# Patient Record
Sex: MALE | Race: WHITE | NOT HISPANIC OR LATINO | Employment: OTHER | URBAN - METROPOLITAN AREA
[De-identification: names, ages, dates, MRNs, and addresses within clinical notes are randomized per-mention and may not be internally consistent; named-entity substitution may affect disease eponyms.]

---

## 2019-12-27 ENCOUNTER — HOSPITAL ENCOUNTER (OUTPATIENT)
Dept: TELEMEDICINE | Facility: HOSPITAL | Age: 54
Discharge: HOME | End: 2019-12-27

## 2019-12-27 DIAGNOSIS — F11.10 OPIOID ABUSE: ICD-10-CM

## 2019-12-27 PROCEDURE — G0425 PR INPT TELEHEALTH CONSULT 30M: ICD-10-PCS | Mod: GT,,, | Performed by: NURSE PRACTITIONER

## 2019-12-27 PROCEDURE — G0425 INPT/ED TELECONSULT30: HCPCS | Mod: GT,,, | Performed by: NURSE PRACTITIONER

## 2019-12-27 NOTE — CONSULTS
"[facility]  Psychiatry  Telepsychiatry Consult Note    Please see previous notes:    Patient agreeable to consultation via telepsychiatry.    Tele-Consultation from Psychiatry started: 12/27/2019 at 1520  The chief complaint leading to psychiatric consultation is: suicidal ideations  This consultation was requested by Dr. Rafat Avendaño, the Emergency Department attending physician.  The location of the consulting psychiatrist is [address] [place] [address].  The patient location is [facility]   The patient arrived at the ED at:   Also present with the patient at the time of the consultation:     Patient Identification:   Jose Elias Downing is a 54 y.o. male.    Patient information was obtained from patient.  Patient presented voluntarily to the Emergency Department by private vehicle.    Consults  Subjective:     History of Present Illness:    Pt is a 54-year old male with PMHx of opioid dependence who presents to ED via [facility] for suicidal ideations.  Pt reports that his mother initiated OPC because she was reacted to his comments " opioid withdrawals make you want to kill yourself".  Pt denies suicidal intent and stated, "I was just trying to explain to her how hard it is but I would never do anything to hurt myself.  Pt reports that hs has been addicted to oxycodone for past 4-5 years and has also snorted heroin.  Denies any hx of treatment.  Pt reports that he recently started looking into outpatient rehab services.  Denies SI/HI/AVH.      Psychiatric History:   Previous Psychiatric Hospitalizations: No   Previous Medication Trials: No   Previous Suicide Attempts: no   History of Violence: no  History of Depression: no  History of Carey: no  History of Auditory/Visual Hallucination no  History of Delusions: n  Outpatient psychiatrist (current & past): No    Substance Abuse History:  Tobacco:No  Alcohol: No  Illicit " "Substances:Yes  Detox/Rehab: No    Legal History: Past charges/incarcerations: No     Family Psychiatric History: none    Social History:  Employment Status/Finances:Employed  Relationship Status/Sexual Orientation: :   Children: yes   Housing Status: Home   Access to gun: NO    Psychiatric Mental Status Exam:  Arousal: alert  Sensorium/Orientation: oriented to grossly intact  Behavior/Cooperation: normal, cooperative   Speech: normal tone, normal rate, normal pitch, normal volume  Language: grossly intact  Mood: " okay "   Affect: appropriate  Thought Process: normal and logical  Thought Content:   Auditory hallucinations: NO  Visual hallucinations: NO  Paranoia: NO  Delusions:  NO  Suicidal ideation: NO  Homicidal ideation: NO  Attention/Concentration:  intact  Memory:    Recent:  Intact   Remote: Intact   3/3 immediate, 3/3 at 5 min  Fund of Knowledge: Intact   Abstract reasoning: proverbs were abstract  Insight: intact  Judgment: behavior is adequate to circumstances      Past Medical History: No past medical history on file.   Laboratory Data: Labs Reviewed - No data to display    Neurological History:  Seizures: No  Head trauma: No    Allergies:   Review of patient's allergies indicates:  Allergies not on file    Medications in ER: Medications - No data to display    Medications at home: none    No new subjective & objective note has been filed under this hospital service since the last note was generated.      Assessment - Diagnosis - Goals:     Diagnosis/Impression: opioid abuse    Rec: Pt denies any suicidal ideations and appear to be low risk.  Appears stable for discharge with agreed plan to follow-up with [facility] re[facility]. Please ask  to provide local resources for outpatient rehab.     Med Recs: none    Time with patient: 30 minutes      More than 50% of the time was spent counseling/coordinating care    Consulting clinician was informed of the encounter and consult note.    Consultation " ended: 12/27/2019 at 1551    Dominick Pro III, NP   Psychiatry  [facility]